# Patient Record
Sex: MALE | Race: WHITE | NOT HISPANIC OR LATINO | Employment: FULL TIME | URBAN - METROPOLITAN AREA
[De-identification: names, ages, dates, MRNs, and addresses within clinical notes are randomized per-mention and may not be internally consistent; named-entity substitution may affect disease eponyms.]

---
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
## 2021-02-04 ENCOUNTER — APPOINTMENT (OUTPATIENT)
Dept: RADIOLOGY | Facility: MEDICAL CENTER | Age: 21
End: 2021-02-04
Attending: EMERGENCY MEDICINE
Payer: COMMERCIAL

## 2021-02-04 ENCOUNTER — HOSPITAL ENCOUNTER (EMERGENCY)
Facility: MEDICAL CENTER | Age: 21
End: 2021-02-04
Attending: EMERGENCY MEDICINE | Admitting: EMERGENCY MEDICINE
Payer: COMMERCIAL

## 2021-02-04 VITALS
TEMPERATURE: 97.6 F | RESPIRATION RATE: 18 BRPM | OXYGEN SATURATION: 97 % | HEIGHT: 69 IN | DIASTOLIC BLOOD PRESSURE: 68 MMHG | WEIGHT: 142.2 LBS | BODY MASS INDEX: 21.06 KG/M2 | SYSTOLIC BLOOD PRESSURE: 104 MMHG | HEART RATE: 69 BPM

## 2021-02-04 DIAGNOSIS — S43.005A DISLOCATION OF LEFT SHOULDER JOINT, INITIAL ENCOUNTER: ICD-10-CM

## 2021-02-04 PROCEDURE — 99283 EMERGENCY DEPT VISIT LOW MDM: CPT

## 2021-02-04 PROCEDURE — 73030 X-RAY EXAM OF SHOULDER: CPT | Mod: LT

## 2021-02-04 ASSESSMENT — PAIN SCALES - WONG BAKER: WONGBAKER_NUMERICALRESPONSE: HURTS JUST A LITTLE BIT

## 2021-02-05 NOTE — ED TRIAGE NOTES
Chief Complaint   Patient presents with   • Shoulder Injury     fell skiing this am and dislocatd his right shoulder, it was reduced on the hill but he was advised to come have it evaluated.   No pain in left shoulder

## 2021-02-05 NOTE — ED NOTES
Dc instructions discussed with patient at bedside. All questions answered at this time. VSS. No IV in place at this time. Pt to lobby without incident. self to drive patient home.

## 2021-02-05 NOTE — ED PROVIDER NOTES
ED Provider Note    CHIEF COMPLAINT  Chief Complaint   Patient presents with   • Shoulder Injury     fell skiing this am and dislocatd his right shoulder, it was reduced on the hill but he was advised to come have it evaluated.       HPI  Lalit Pennington is a 20 y.o. male who presents to the emergency department states that he had a left shoulder injury.  Endorse the fact that he was skiing earlier today, and off a jump and landed hip but when he came down he jolted his whole body and felt like his shoulder came out of socket/dislocated.  He had pain at that point time.  He told his father who came over and pushed it back in place.  Since that time he has been skiing without difficulty is slight pain at the area, denies loss of sensation or strength to his left upper extremity, history of dislocation in the past, neck pain, back pain, fever.  Patient is right-hand dominant    REVIEW OF SYSTEMS  Positives as above. Pertinent negatives include loss of sensation or strength left upper extremity, pale cold hand.     PAST MEDICAL HISTORY  No past medical history on file.    FAMILY HISTORY  Noncontributory    SOCIAL HISTORY  Social History     Socioeconomic History   • Marital status: Single     Spouse name: Not on file   • Number of children: Not on file   • Years of education: Not on file   • Highest education level: Not on file   Occupational History   • Not on file   Social Needs   • Financial resource strain: Not on file   • Food insecurity     Worry: Not on file     Inability: Not on file   • Transportation needs     Medical: Not on file     Non-medical: Not on file   Tobacco Use   • Smoking status: Not on file   Substance and Sexual Activity   • Alcohol use: Not on file   • Drug use: Not on file   • Sexual activity: Not on file   Lifestyle   • Physical activity     Days per week: Not on file     Minutes per session: Not on file   • Stress: Not on file   Relationships   • Social connections     Talks on phone: Not on  "file     Gets together: Not on file     Attends Judaism service: Not on file     Active member of club or organization: Not on file     Attends meetings of clubs or organizations: Not on file     Relationship status: Not on file   • Intimate partner violence     Fear of current or ex partner: Not on file     Emotionally abused: Not on file     Physically abused: Not on file     Forced sexual activity: Not on file   Other Topics Concern   • Not on file   Social History Narrative   • Not on file       SURGICAL HISTORY  No past surgical history on file.    CURRENT MEDICATIONS  Home Medications    **Home medications have not yet been reviewed for this encounter**         ALLERGIES  Not on File    PHYSICAL EXAM  VITAL SIGNS: /68   Pulse 69   Temp 36.4 °C (97.6 °F) (Temporal)   Resp 18   Ht 1.753 m (5' 9\")   Wt 64.5 kg (142 lb 3.2 oz)   SpO2 97%   BMI 21.00 kg/m²      Constitutional: Well developed, Well nourished, No acute distress, Non-toxic appearance.   Eyes: PERRLA, EOMI, Conjunctiva normal, No discharge.    Skin: Warm, Dry, No erythema, No rash.   Extremities: Full range of motion, no step-off deformity, no sulcus sign of the left shoulder, no AC tenderness, no clavicular tenderness, no elbow tenderness, distal radial and ulnar pulses are brisk, the patient has full range of motion in flexion extension, abduction and adduction supination pronation  Neurologic: Axillary, ulnar, medial and radial nerve intact sensation and strength.      RADIOLOGY/PROCEDURES  DX-SHOULDER 2+ LEFT   Final Result      Unremarkable shoulder series.                  INTERPRETING LOCATION:  [address] [place] [address], [facility]            COURSE & MEDICAL DECISION MAKING  Pertinent Labs & Imaging studies reviewed. (See chart for details)  This is a pleasant 19-year-old male who presents with left shoulder injury.  I doubt the patient actually dislocated shoulder as here he has full range of motion, is no tenderness, no evidence of " Hill-Sachs or Bankart lesion on x-ray.  He probably subluxed his shoulder and his father helped put it back in place.  The patient denied a sling here in the emergency department, given physical therapy techniques and follow-up with the orthopedic surgeon about increasing symptomatology.  He has no vascular or neuro compromise.      FINAL IMPRESSION     1. Dislocation of left shoulder joint, initial encounter Active     DISPOSITION:  Patient will be discharged home in stable condition.    FOLLOW UP:  [facility], Emergency Dept  [address]  [facility] [id number] [phone]    If symptoms worsen    Fletcher Ortiz M.D.  [address] [facility] [id number] [phone]    Schedule an appointment as soon as possible for a visit         Electronically signed by: Say Mccracken D.O., 2/4/2021 5:29 PM

## 2022-01-08 ENCOUNTER — HOSPITAL ENCOUNTER (OUTPATIENT)
Dept: RADIOLOGY | Facility: MEDICAL CENTER | Age: 22
End: 2022-01-08
Attending: CLINICAL NURSE SPECIALIST
Payer: COMMERCIAL

## 2022-01-08 DIAGNOSIS — R20.2 PARESTHESIA: ICD-10-CM

## 2022-01-08 PROCEDURE — 72141 MRI NECK SPINE W/O DYE: CPT

## 2022-03-27 ENCOUNTER — HOSPITAL ENCOUNTER (EMERGENCY)
Facility: MEDICAL CENTER | Age: 22
End: 2022-03-27
Attending: EMERGENCY MEDICINE
Payer: COMMERCIAL

## 2022-03-27 VITALS
RESPIRATION RATE: 16 BRPM | OXYGEN SATURATION: 95 % | HEIGHT: 70 IN | TEMPERATURE: 97.6 F | DIASTOLIC BLOOD PRESSURE: 95 MMHG | WEIGHT: 135 LBS | HEART RATE: 77 BPM | BODY MASS INDEX: 19.33 KG/M2 | SYSTOLIC BLOOD PRESSURE: 158 MMHG

## 2022-03-27 DIAGNOSIS — F13.939 BENZODIAZEPINE WITHDRAWAL WITH COMPLICATION (HCC): ICD-10-CM

## 2022-03-27 PROCEDURE — 700102 HCHG RX REV CODE 250 W/ 637 OVERRIDE(OP): Performed by: EMERGENCY MEDICINE

## 2022-03-27 PROCEDURE — 99284 EMERGENCY DEPT VISIT MOD MDM: CPT

## 2022-03-27 PROCEDURE — A9270 NON-COVERED ITEM OR SERVICE: HCPCS | Performed by: EMERGENCY MEDICINE

## 2022-03-27 RX ORDER — DIAZEPAM 5 MG/1
5 TABLET ORAL ONCE
Status: COMPLETED | OUTPATIENT
Start: 2022-03-27 | End: 2022-03-27

## 2022-03-27 RX ADMIN — DIAZEPAM 5 MG: 5 TABLET ORAL at 15:55

## 2022-03-27 NOTE — ED TRIAGE NOTES
José Luis Pennington  21 y.o.  Chief Complaint   Patient presents with   • Altered Mental Status     Per [facility]     BIBA for above, per EMS, [facility] called because pt was not answering A&O questions. Pt answering all questions appropriately upon assessment, no additional complaints noted. Pt currently detoxing at [facility] for xanax and alcohol, has been there for past 2 weeks. Reports getting upsetting emails this AM which has caused some stress.

## 2022-03-27 NOTE — DISCHARGE INSTRUCTIONS
You were seen in the emergency department for a transient episode of altered mental status while at a rehab facility.  At this time, your physical and neurologic exams are reassuring.  You appear to have shakes consistent with benzodiazepine withdrawal.  You were given a dose of oral Valium to help treat this.  At this time you are medically cleared for return to your rehab facility.    Please return to the emergency department or seek medical attention if you develop:  Fevers, uncontrollable shakes, seizures, any other new or concerning findings

## 2022-03-27 NOTE — ED NOTES
Patient discharged in stable condition per orders. Wristband removed per protocol. Patient verbalized understanding of all discharge instructions. All belongings accounted for. Ambulatory to lobby with steady gait. Pt provided cab voucher to go back to [facility].

## 2022-03-27 NOTE — ED PROVIDER NOTES
"ED Provider Note        Means of Arrival: EMS  History obtained from: Patient, EMS    CHIEF COMPLAINT  Chief Complaint   Patient presents with   • Altered Mental Status     Per [facility]       HPI  José Luis Pennington is a 21 y.o. male who presents from [facility].  By report, the patient was not answering the orientation questions and was sent for evaluation of altered mental status.  Patient reports that he is detoxing from benzodiazepines and fentanyl.  He reports he has been there for 2 weeks.  He states that he did not take his Xanax last night, and was feeling well at around 10 this morning he began feeling unwell and fatigued and went to bed.  He reports that he now feels back to normal.  He feels slight shakes, however these are improved compared to prior shakes from his withdrawal course.    REVIEW OF SYSTEMS    CONSTITUTIONAL:  No fever.  CARDIOVASCULAR:  No chest discomfort.  RESPIRATORY:  No pleuritic chest pain.  GASTROINTESTINAL:  No abdominal pain.    See HPI for further details.       PAST MEDICAL HISTORY  History reviewed. No pertinent past medical history.    FAMILY HISTORY  History reviewed. No pertinent family history.    SOCIAL HISTORY   reports that he has been smoking cigarettes. He has been smoking about 1.00 pack per day. He has never used smokeless tobacco. He reports previous alcohol use. He reports current drug use. Drug: Inhaled.    SURGICAL HISTORY  History reviewed. No pertinent surgical history.    CURRENT MEDICATIONS  Home Medications     Reviewed by Daniella Gamez R.N. (Registered Nurse) on 03/27/22 at 1517  Med List Status: <None>   Medication Last Dose Status        Patient João Taking any Medications                       ALLERGIES  No Known Allergies    PHYSICAL EXAM  VITAL SIGNS: /74   Pulse (!) 115   Temp 36.3 °C (97.3 °F) (Temporal)   Resp 16   Ht 1.778 m (5' 10\")   Wt 61.2 kg (135 lb)   SpO2 98%   BMI 19.37 kg/m²    Gen: alert, no acute " distress  HENT: ATNC  Eyes: normal conjunctiva  Resp: No respiratory distress.  Clear to auscultation bilaterally  CV: No JVD, RRR, tachycardic, no murmurs, rubs, gallops  Abd: Non-distended, nontender  Extremities: No deformity, no pedal edema, no calf tenderness  Neuro: GCS 15, moves all extremities, slight tremor.      COURSE & MEDICAL DECISION MAKING  Pertinent Labs & Imaging studies reviewed. (See chart for details)    Patient presents with transient altered mental status, however has a reassuring exam here.  Low suspicion for seizure.  No evidence of tongue biting or other injuries.  He is slightly tachycardic and shaky consistent with benzodiazepine withdrawal.  No stigmata of DVT/PE.  No evidence of other toxidrome.  Patient would like to return to his detox facility.  Will treat with single dose of diazepam to help control his symptoms prior to transfer.  No evidence of traumatic brain injury, persistent altered mental status, infection, other dangerous conditions.    Appropriate PPE were worn during this encounter.    FINAL IMPRESSION  1. Benzodiazepine withdrawal with complication (HCC)         DISPOSITION:  Patient will be discharged home in stable condition.    FOLLOW UP:  [facility], Emergency Dept  [address] [facility] [id number] [phone]    If symptoms worsen    [facility] - Behavioral Health Counseling  [address] [facility] [address]  [phone]        [facility] - Primary Care  [address] [address]  [phone]            This dictation was created using voice recognition software. The accuracy of the dictation is limited to the abilities of the software. I expect there may be some errors of grammar and possibly content. The nursing notes were reviewed and certain aspects of this information were incorporated into this note.

## 2022-03-27 NOTE — DISCHARGE PLANNING
Medical Social Work    MSW received a call from bedside RN that pt is medically cleared and ready to return to [facility] for continued detox.  MSW contacted Sravani, house supervisor with [facility] who will accept pt back.  [facility]ift verified that pt is NOT on a legal hold and is there for detox.  Cab voucher (# [id number]) given to bedside RN for pt to return to [facility] safely.